# Patient Record
Sex: FEMALE | Race: WHITE
[De-identification: names, ages, dates, MRNs, and addresses within clinical notes are randomized per-mention and may not be internally consistent; named-entity substitution may affect disease eponyms.]

---

## 2021-12-10 ENCOUNTER — HOSPITAL ENCOUNTER (OUTPATIENT)
Dept: HOSPITAL 76 - DI.S | Age: 71
Discharge: HOME | End: 2021-12-10
Attending: PHYSICIAN ASSISTANT
Payer: MEDICARE

## 2021-12-10 DIAGNOSIS — M25.561: Primary | ICD-10-CM

## 2021-12-10 DIAGNOSIS — M25.461: ICD-10-CM

## 2021-12-10 NOTE — XRAY REPORT
PROCEDURE:  Knee 3 View RT

 

INDICATIONS:  KNEE PAIN, RIGHT

 

TECHNIQUE:  3 views of the right knee(s) were acquired.  

 

COMPARISON:  None.

 

 

FINDINGS:

BONES/JOINT: No acute, displaced fracture or dislocation. Small suprapatellar joint effusion. Mild tr
icompartment osteophytosis, most prominent along the lateral aspect of the patellofemoral articulatio
n.

 

SOFT TISSUES: No significant abnormality.

 

IMPRESSION:  

1.No acute osseous abnormality.

2.Small suprapatellar joint effusion.

 

 

 

Reviewed by: Cristiano Sauceda MD on 12/10/2021 12:39 PM PST

Approved by: Cristiano Sauceda MD on 12/10/2021 12:39 PM PST

 

 

Station ID:  [id number]